# Patient Record
Sex: FEMALE | URBAN - METROPOLITAN AREA
[De-identification: names, ages, dates, MRNs, and addresses within clinical notes are randomized per-mention and may not be internally consistent; named-entity substitution may affect disease eponyms.]

---

## 2021-12-09 ENCOUNTER — OFFICE VISIT (OUTPATIENT)
Dept: URBAN - METROPOLITAN AREA CLINIC 6 | Facility: CLINIC | Age: 71
End: 2021-12-09
Payer: MEDICARE

## 2021-12-09 DIAGNOSIS — H16.223 KERATOCONJUNCTIVITIS SICCA, BILATERAL: ICD-10-CM

## 2021-12-09 DIAGNOSIS — H26.493 OTHER SECONDARY CATARACT, BILATERAL: Primary | ICD-10-CM

## 2021-12-09 DIAGNOSIS — H43.813 VITREOUS DEGENERATION, BILATERAL: ICD-10-CM

## 2021-12-09 DIAGNOSIS — H35.373 PUCKERING OF MACULA, BILATERAL: ICD-10-CM

## 2021-12-09 PROCEDURE — 92014 COMPRE OPH EXAM EST PT 1/>: CPT | Performed by: OPTOMETRIST

## 2021-12-09 RX ORDER — CARVEDILOL 6.25 MG/1
6.25 MG TABLET, FILM COATED ORAL AS DIRECTED
Refills: 0 | Status: ACTIVE
Start: 2021-12-09

## 2021-12-09 RX ORDER — NAPROXEN 500 MG/1
500 MG TABLET ORAL AS DIRECTED
Refills: 0 | Status: ACTIVE
Start: 2021-12-09

## 2021-12-09 RX ORDER — NITROFURANTOIN MACROCRYSTALS 50 MG/1
50 MG CAPSULE ORAL
Refills: 0 | Status: ACTIVE
Start: 2021-12-09

## 2021-12-09 RX ORDER — AMITRIPTYLINE HYDROCHLORIDE 50 MG/1
50 MG TABLET, FILM COATED ORAL
Refills: 0 | Status: ACTIVE
Start: 2021-12-09

## 2021-12-09 RX ORDER — PRAVASTATIN SODIUM 20 MG/1
20 MG TABLET ORAL
Refills: 0 | Status: ACTIVE
Start: 2021-12-09

## 2021-12-09 RX ORDER — AMLODIPINE BESYLATE 5 MG/1
5 MG TABLET ORAL AS DIRECTED
Refills: 0 | Status: ACTIVE
Start: 2021-12-09

## 2021-12-09 RX ORDER — DULOXETINE 60 MG/1
60 MG CAPSULE, DELAYED RELEASE PELLETS ORAL AS DIRECTED
Refills: 0 | Status: ACTIVE
Start: 2021-12-09

## 2021-12-09 RX ORDER — EMPAGLIFLOZIN 25 MG/1
25 MG TABLET, FILM COATED ORAL AS DIRECTED
Refills: 0 | Status: ACTIVE
Start: 2021-12-09

## 2021-12-09 RX ORDER — LEVOFLOXACIN 500 MG/1
500 MG TABLET, FILM COATED ORAL
Refills: 0 | Status: ACTIVE
Start: 2021-12-09

## 2021-12-09 RX ORDER — VALACYCLOVIR 500 MG/1
500 MG TABLET, FILM COATED ORAL
Refills: 0 | Status: ACTIVE
Start: 2021-12-09

## 2021-12-09 RX ORDER — CEVIMELINE HYDROCHLORIDE 30 MG/1
30 MG CAPSULE ORAL
Refills: 0 | Status: ACTIVE
Start: 2021-12-09

## 2021-12-09 RX ORDER — LOTEPREDNOL ETABONATE 5 MG/ML
0.5 % SUSPENSION/ DROPS OPHTHALMIC
Qty: 10 | Refills: 0 | Status: ACTIVE
Start: 2021-12-09

## 2021-12-09 RX ORDER — ESTRADIOL 0.05 MG/D
PATCH TRANSDERMAL
Refills: 0 | Status: ACTIVE
Start: 2021-12-09

## 2021-12-09 RX ORDER — PREGABALIN 100 MG/1
100 MG CAPSULE ORAL AS DIRECTED
Refills: 0 | Status: ACTIVE
Start: 2021-12-09

## 2021-12-09 RX ORDER — ERGOCALCIFEROL 1.25 MG/1
CAPSULE ORAL AS DIRECTED
Refills: 0 | Status: ACTIVE
Start: 2021-12-09

## 2021-12-09 RX ORDER — INSULIN DETEMIR 100 [IU]/ML
INJECTION, SOLUTION SUBCUTANEOUS AS DIRECTED
Refills: 0 | Status: ACTIVE
Start: 2021-12-09

## 2021-12-09 NOTE — IMPRESSION/PLAN
Impression: Keratoconjunctivitis sicca, bilateral: S54.806. Plan: Significant dry eye both eyes (R32.153)- Given lack or response to topical over the counter lubrication and punctal plugs, continue Restasis BID OU. Recommend continued use of artificial tears 3-4 x daily and lubricant ointment or gel at bedtime. Start Lotemax TID for one week then as need it, alternative is Ketorolac.

## 2021-12-09 NOTE — IMPRESSION/PLAN
Impression: Puckering of macula, bilateral: H35.373. Plan: Observe- Mild with no CME - Plan to observe for now without intervention.

## 2022-06-09 ENCOUNTER — OFFICE VISIT (OUTPATIENT)
Dept: URBAN - METROPOLITAN AREA CLINIC 6 | Facility: CLINIC | Age: 72
End: 2022-06-09
Payer: MEDICARE

## 2022-06-09 DIAGNOSIS — H26.493 OTHER SECONDARY CATARACT, BILATERAL: ICD-10-CM

## 2022-06-09 DIAGNOSIS — E11.9 TYPE 2 DIABETES MELLITUS WITHOUT COMPLICATIONS: Primary | ICD-10-CM

## 2022-06-09 DIAGNOSIS — H16.223 KERATOCONJUNCTIVITIS SICCA, BILATERAL: ICD-10-CM

## 2022-06-09 DIAGNOSIS — H35.413 LATTICE DEGENERATION OF RETINA, BILATERAL: ICD-10-CM

## 2022-06-09 DIAGNOSIS — H43.813 VITREOUS DEGENERATION, BILATERAL: ICD-10-CM

## 2022-06-09 PROCEDURE — 92014 COMPRE OPH EXAM EST PT 1/>: CPT | Performed by: OPTOMETRIST

## 2022-06-09 RX ORDER — LIFITEGRAST 50 MG/ML
5 % SOLUTION/ DROPS OPHTHALMIC
Qty: 180 | Refills: 3 | Status: ACTIVE
Start: 2022-06-09

## 2022-06-09 ASSESSMENT — INTRAOCULAR PRESSURE
OD: 12
OS: 15

## 2022-06-09 NOTE — IMPRESSION/PLAN
Impression: Keratoconjunctivitis sicca, bilateral: J78.797. Plan: continue Xiidra BID OU. Recommend continued use of artificial tears 3-4 x daily and lubricant ointment or gel at bedtime.

## 2022-06-09 NOTE — IMPRESSION/PLAN
Impression: Type 2 diabetes mellitus without complications: R30.6. Plan: Diabetes Mellitus Type II without signs of diabetic retinopathy either eye - Discussed the pathophysiology of diabetes and its effect on the eye. Stressed the importance of strong glucose control. Advised of importance of at least annual dilated examinations, and to contact us immediately for any problems or concerns.

## 2025-02-12 ENCOUNTER — OFFICE VISIT (OUTPATIENT)
Dept: URBAN - METROPOLITAN AREA CLINIC 6 | Facility: CLINIC | Age: 75
End: 2025-02-12
Payer: MEDICARE

## 2025-02-12 DIAGNOSIS — H00.015 HORDEOLUM EXTERNUM LEFT LOWER EYELID: Primary | ICD-10-CM

## 2025-02-12 PROCEDURE — 99213 OFFICE O/P EST LOW 20 MIN: CPT | Performed by: OPTOMETRIST

## 2025-02-12 RX ORDER — DOXYCYCLINE HYCLATE 100 MG/1
100 MG TABLET, COATED ORAL
Qty: 20 | Refills: 0 | Status: ACTIVE
Start: 2025-02-12

## 2025-02-12 RX ORDER — NEOMYCIN SULFATE, POLYMYXIN B SULFATE AND DEXAMETHASONE 1; 3.5; 1 MG/G; MG/G; [USP'U]/G
OINTMENT OPHTHALMIC
Qty: 3.5 | Refills: 0 | Status: ACTIVE
Start: 2025-02-12

## 2025-02-12 ASSESSMENT — INTRAOCULAR PRESSURE
OD: 11
OS: 12

## 2025-02-27 ENCOUNTER — OFFICE VISIT (OUTPATIENT)
Dept: URBAN - METROPOLITAN AREA CLINIC 6 | Facility: CLINIC | Age: 75
End: 2025-02-27
Payer: MEDICARE

## 2025-02-27 DIAGNOSIS — H00.015 HORDEOLUM EXTERNUM LEFT LOWER EYELID: Primary | ICD-10-CM

## 2025-02-27 PROCEDURE — 99212 OFFICE O/P EST SF 10 MIN: CPT | Performed by: OPTOMETRIST

## 2025-02-27 ASSESSMENT — INTRAOCULAR PRESSURE
OD: 10
OS: 8

## 2025-05-27 ENCOUNTER — OFFICE VISIT (OUTPATIENT)
Dept: URBAN - METROPOLITAN AREA CLINIC 6 | Facility: CLINIC | Age: 75
End: 2025-05-27
Payer: MEDICARE

## 2025-05-27 DIAGNOSIS — H16.223 KERATOCONJUNCTIVITIS SICCA, BILATERAL: ICD-10-CM

## 2025-05-27 DIAGNOSIS — E11.9 TYPE 2 DIABETES MELLITUS WITHOUT COMPLICATIONS: Primary | ICD-10-CM

## 2025-05-27 DIAGNOSIS — H26.493 OTHER SECONDARY CATARACT, BILATERAL: ICD-10-CM

## 2025-05-27 DIAGNOSIS — H43.813 VITREOUS DEGENERATION, BILATERAL: ICD-10-CM

## 2025-05-27 DIAGNOSIS — H35.413 LATTICE DEGENERATION OF RETINA, BILATERAL: ICD-10-CM

## 2025-05-27 PROCEDURE — 99214 OFFICE O/P EST MOD 30 MIN: CPT | Performed by: OPTOMETRIST

## 2025-05-27 RX ORDER — LIFITEGRAST 50 MG/ML
5 % SOLUTION/ DROPS OPHTHALMIC
Qty: 180 | Refills: 3 | Status: ACTIVE
Start: 2025-05-27

## 2025-05-27 ASSESSMENT — INTRAOCULAR PRESSURE
OD: 17
OS: 15